# Patient Record
Sex: FEMALE | Race: WHITE | NOT HISPANIC OR LATINO | Employment: FULL TIME | URBAN - METROPOLITAN AREA
[De-identification: names, ages, dates, MRNs, and addresses within clinical notes are randomized per-mention and may not be internally consistent; named-entity substitution may affect disease eponyms.]

---

## 2023-05-04 ENCOUNTER — APPOINTMENT (OUTPATIENT)
Dept: PRIMARY CARE | Facility: CLINIC | Age: 53
End: 2023-05-04
Payer: COMMERCIAL

## 2023-05-05 ENCOUNTER — LAB (OUTPATIENT)
Dept: LAB | Facility: LAB | Age: 53
End: 2023-05-05
Payer: COMMERCIAL

## 2023-05-05 ENCOUNTER — OFFICE VISIT (OUTPATIENT)
Dept: PRIMARY CARE | Facility: CLINIC | Age: 53
End: 2023-05-05
Payer: COMMERCIAL

## 2023-05-05 VITALS
HEART RATE: 92 BPM | WEIGHT: 148 LBS | TEMPERATURE: 97.5 F | DIASTOLIC BLOOD PRESSURE: 94 MMHG | RESPIRATION RATE: 18 BRPM | HEIGHT: 61 IN | OXYGEN SATURATION: 98 % | SYSTOLIC BLOOD PRESSURE: 142 MMHG | BODY MASS INDEX: 27.94 KG/M2

## 2023-05-05 DIAGNOSIS — Z00.00 ANNUAL PHYSICAL EXAM: ICD-10-CM

## 2023-05-05 DIAGNOSIS — Z12.31 ENCOUNTER FOR SCREENING MAMMOGRAM FOR MALIGNANT NEOPLASM OF BREAST: ICD-10-CM

## 2023-05-05 DIAGNOSIS — Z12.11 COLON CANCER SCREENING: ICD-10-CM

## 2023-05-05 DIAGNOSIS — J30.9 ALLERGIC SINUSITIS: Primary | ICD-10-CM

## 2023-05-05 DIAGNOSIS — J01.00 ACUTE NON-RECURRENT MAXILLARY SINUSITIS: ICD-10-CM

## 2023-05-05 LAB
ALANINE AMINOTRANSFERASE (SGPT) (U/L) IN SER/PLAS: 12 U/L (ref 7–45)
ALBUMIN (G/DL) IN SER/PLAS: 4.6 G/DL (ref 3.4–5)
ALKALINE PHOSPHATASE (U/L) IN SER/PLAS: 72 U/L (ref 33–110)
ANION GAP IN SER/PLAS: 15 MMOL/L (ref 10–20)
ASPARTATE AMINOTRANSFERASE (SGOT) (U/L) IN SER/PLAS: 17 U/L (ref 9–39)
BASOPHILS (10*3/UL) IN BLOOD BY AUTOMATED COUNT: 0.07 X10E9/L (ref 0–0.1)
BASOPHILS/100 LEUKOCYTES IN BLOOD BY AUTOMATED COUNT: 0.6 % (ref 0–2)
BILIRUBIN TOTAL (MG/DL) IN SER/PLAS: 0.4 MG/DL (ref 0–1.2)
CALCIUM (MG/DL) IN SER/PLAS: 10.1 MG/DL (ref 8.6–10.3)
CARBON DIOXIDE, TOTAL (MMOL/L) IN SER/PLAS: 27 MMOL/L (ref 21–32)
CHLORIDE (MMOL/L) IN SER/PLAS: 100 MMOL/L (ref 98–107)
CHOLESTEROL (MG/DL) IN SER/PLAS: 182 MG/DL (ref 0–199)
CHOLESTEROL IN HDL (MG/DL) IN SER/PLAS: 92.9 MG/DL
CHOLESTEROL/HDL RATIO: 2
CREATININE (MG/DL) IN SER/PLAS: 0.74 MG/DL (ref 0.5–1.05)
EOSINOPHILS (10*3/UL) IN BLOOD BY AUTOMATED COUNT: 0.07 X10E9/L (ref 0–0.7)
EOSINOPHILS/100 LEUKOCYTES IN BLOOD BY AUTOMATED COUNT: 0.6 % (ref 0–6)
ERYTHROCYTE DISTRIBUTION WIDTH (RATIO) BY AUTOMATED COUNT: 11.8 % (ref 11.5–14.5)
ERYTHROCYTE MEAN CORPUSCULAR HEMOGLOBIN CONCENTRATION (G/DL) BY AUTOMATED: 33.3 G/DL (ref 32–36)
ERYTHROCYTE MEAN CORPUSCULAR VOLUME (FL) BY AUTOMATED COUNT: 97 FL (ref 80–100)
ERYTHROCYTES (10*6/UL) IN BLOOD BY AUTOMATED COUNT: 4.63 X10E12/L (ref 4–5.2)
GFR FEMALE: >90 ML/MIN/1.73M2
GLUCOSE (MG/DL) IN SER/PLAS: 75 MG/DL (ref 74–99)
HEMATOCRIT (%) IN BLOOD BY AUTOMATED COUNT: 45 % (ref 36–46)
HEMOGLOBIN (G/DL) IN BLOOD: 15 G/DL (ref 12–16)
HIV 1/ 2 AG/AB SCREEN: NONREACTIVE
IMMATURE GRANULOCYTES/100 LEUKOCYTES IN BLOOD BY AUTOMATED COUNT: 0.2 % (ref 0–0.9)
LDL: 63 MG/DL (ref 0–99)
LEUKOCYTES (10*3/UL) IN BLOOD BY AUTOMATED COUNT: 11.5 X10E9/L (ref 4.4–11.3)
LYMPHOCYTES (10*3/UL) IN BLOOD BY AUTOMATED COUNT: 3.3 X10E9/L (ref 1.2–4.8)
LYMPHOCYTES/100 LEUKOCYTES IN BLOOD BY AUTOMATED COUNT: 28.7 % (ref 13–44)
MONOCYTES (10*3/UL) IN BLOOD BY AUTOMATED COUNT: 0.6 X10E9/L (ref 0.1–1)
MONOCYTES/100 LEUKOCYTES IN BLOOD BY AUTOMATED COUNT: 5.2 % (ref 2–10)
NEUTROPHILS (10*3/UL) IN BLOOD BY AUTOMATED COUNT: 7.45 X10E9/L (ref 1.2–7.7)
NEUTROPHILS/100 LEUKOCYTES IN BLOOD BY AUTOMATED COUNT: 64.7 % (ref 40–80)
NRBC (PER 100 WBCS) BY AUTOMATED COUNT: 0 /100 WBC (ref 0–0)
PLATELETS (10*3/UL) IN BLOOD AUTOMATED COUNT: 385 X10E9/L (ref 150–450)
POTASSIUM (MMOL/L) IN SER/PLAS: 4.3 MMOL/L (ref 3.5–5.3)
PROTEIN TOTAL: 7.3 G/DL (ref 6.4–8.2)
SODIUM (MMOL/L) IN SER/PLAS: 138 MMOL/L (ref 136–145)
THYROTROPIN (MIU/L) IN SER/PLAS BY DETECTION LIMIT <= 0.05 MIU/L: 0.52 MIU/L (ref 0.44–3.98)
TRIGLYCERIDE (MG/DL) IN SER/PLAS: 133 MG/DL (ref 0–149)
UREA NITROGEN (MG/DL) IN SER/PLAS: 15 MG/DL (ref 6–23)
VLDL: 27 MG/DL (ref 0–40)

## 2023-05-05 PROCEDURE — 86803 HEPATITIS C AB TEST: CPT

## 2023-05-05 PROCEDURE — 85025 COMPLETE CBC W/AUTO DIFF WBC: CPT

## 2023-05-05 PROCEDURE — 80061 LIPID PANEL: CPT

## 2023-05-05 PROCEDURE — 80053 COMPREHEN METABOLIC PANEL: CPT

## 2023-05-05 PROCEDURE — 99204 OFFICE O/P NEW MOD 45 MIN: CPT | Performed by: FAMILY MEDICINE

## 2023-05-05 PROCEDURE — 84443 ASSAY THYROID STIM HORMONE: CPT

## 2023-05-05 PROCEDURE — 87389 HIV-1 AG W/HIV-1&-2 AB AG IA: CPT

## 2023-05-05 PROCEDURE — 36415 COLL VENOUS BLD VENIPUNCTURE: CPT

## 2023-05-05 RX ORDER — AZELASTINE 1 MG/ML
1 SPRAY, METERED NASAL 2 TIMES DAILY
Qty: 12 ML | Refills: 2 | Status: SHIPPED | OUTPATIENT
Start: 2023-05-05 | End: 2023-06-30 | Stop reason: SDUPTHER

## 2023-05-05 RX ORDER — ALBUTEROL SULFATE 90 UG/1
2 AEROSOL, METERED RESPIRATORY (INHALATION) EVERY 6 HOURS PRN
COMMUNITY

## 2023-05-05 RX ORDER — SULFAMETHOXAZOLE AND TRIMETHOPRIM 800; 160 MG/1; MG/1
1 TABLET ORAL 2 TIMES DAILY
Qty: 14 TABLET | Refills: 0 | Status: SHIPPED | OUTPATIENT
Start: 2023-05-05 | End: 2023-05-12

## 2023-05-05 RX ORDER — LORATADINE 10 MG/1
10 TABLET ORAL DAILY
Qty: 30 TABLET | Refills: 5 | Status: SHIPPED | OUTPATIENT
Start: 2023-05-05 | End: 2023-05-05 | Stop reason: ENTERED-IN-ERROR

## 2023-05-05 RX ORDER — PREDNISONE 50 MG/1
50 TABLET ORAL DAILY
COMMUNITY
Start: 2023-04-17 | End: 2023-05-26

## 2023-05-05 ASSESSMENT — PAIN SCALES - GENERAL: PAINLEVEL: 7

## 2023-05-05 ASSESSMENT — ENCOUNTER SYMPTOMS
RHINORRHEA: 1
SINUS PRESSURE: 1

## 2023-05-05 ASSESSMENT — PATIENT HEALTH QUESTIONNAIRE - PHQ9
1. LITTLE INTEREST OR PLEASURE IN DOING THINGS: NOT AT ALL
2. FEELING DOWN, DEPRESSED OR HOPELESS: NOT AT ALL
SUM OF ALL RESPONSES TO PHQ9 QUESTIONS 1 AND 2: 0

## 2023-05-05 NOTE — LETTER
May 5, 2023     Patient: Aranza Felipe   YOB: 1970   Date of Visit: 5/5/2023       To Whom It May Concern:    Aranza Felipe was seen in my clinic on 5/5/2023 at 10:45 am. Please excuse Aranza for her absence from work on 5/2 and 5/3     If you have any questions or concerns, please don't hesitate to call.         Sincerely,         Avel Welsh MD        CC: No Recipients

## 2023-05-05 NOTE — PROGRESS NOTES
"Subjective   Patient ID: Aranza Felipe is a 52 y.o. female who presents for Establish Care (Visited hospital April 18 no taste ,no smell ,sinus pain ,swollen throat ).    HPI   Establish care  Sinus and head congestion for 3 weeks  Took 2 days of antibiotics: Doxy- \"face swelled from it \"    Allergies history: dust, spring /fall    Works at [employer]:work excuse for 5/2 and 5/3  Has not seen doctor in 15 years.       Review of Systems   HENT:  Positive for postnasal drip, rhinorrhea and sinus pressure.        Objective   There were no vitals taken for this visit.    Physical Exam  Cardiovascular:      Rate and Rhythm: Normal rate and regular rhythm.   Pulmonary:      Effort: Pulmonary effort is normal.   Abdominal:      Palpations: Abdomen is soft.      Tenderness: There is no abdominal tenderness.   Musculoskeletal:      Cervical back: Neck supple.   Lymphadenopathy:      Cervical: No cervical adenopathy.   Neurological:      Mental Status: She is alert.         Assessment/Plan   Problem List Items Addressed This Visit    None  Visit Diagnoses       Allergic sinusitis    -  Primary    Relevant Medications    azelastine (Astelin) 137 mcg (0.1 %) nasal spray    Acute non-recurrent maxillary sinusitis        Relevant Medications    sulfamethoxazole-trimethoprim (Bactrim DS) 800-160 mg tablet    Encounter for screening mammogram for malignant neoplasm of breast        Relevant Orders    BI mammo bilateral screening tomosynthesis    Annual physical exam        Relevant Orders    Lipid panel    Comprehensive Metabolic Panel    HIV 1/2 antibodies, rapid    Hepatitis C antibody    CBC and Auto Differential    TSH with reflex to Free T4 if abnormal    BI mammo bilateral screening tomosynthesis    Colon cancer screening        Relevant Orders    Colonoscopy               "

## 2023-05-06 LAB — HEPATITIS C VIRUS AB PRESENCE IN SERUM: NONREACTIVE

## 2023-05-23 ENCOUNTER — TELEPHONE (OUTPATIENT)
Dept: PRIMARY CARE | Facility: CLINIC | Age: 53
End: 2023-05-23

## 2023-05-23 NOTE — TELEPHONE ENCOUNTER
----- Message from Avel NARANJO MD sent at 5/23/2023  9:30 AM EDT -----  Notify patient No mammographic evidence of malignancy.Recommendation: Follow-up mammogram in one year or sooner if  clinically indicated.

## 2023-05-26 ENCOUNTER — OFFICE VISIT (OUTPATIENT)
Dept: PRIMARY CARE | Facility: CLINIC | Age: 53
End: 2023-05-26
Payer: COMMERCIAL

## 2023-05-26 VITALS
HEART RATE: 81 BPM | TEMPERATURE: 97.3 F | OXYGEN SATURATION: 99 % | WEIGHT: 151 LBS | RESPIRATION RATE: 18 BRPM | DIASTOLIC BLOOD PRESSURE: 90 MMHG | SYSTOLIC BLOOD PRESSURE: 150 MMHG | BODY MASS INDEX: 28.53 KG/M2

## 2023-05-26 DIAGNOSIS — I10 PRIMARY HYPERTENSION: Primary | ICD-10-CM

## 2023-05-26 PROCEDURE — 3080F DIAST BP >= 90 MM HG: CPT | Performed by: FAMILY MEDICINE

## 2023-05-26 PROCEDURE — 3077F SYST BP >= 140 MM HG: CPT | Performed by: FAMILY MEDICINE

## 2023-05-26 PROCEDURE — 99214 OFFICE O/P EST MOD 30 MIN: CPT | Performed by: FAMILY MEDICINE

## 2023-05-26 RX ORDER — ATENOLOL 25 MG/1
25 TABLET ORAL 2 TIMES DAILY
Qty: 180 TABLET | Refills: 0 | Status: SHIPPED | OUTPATIENT
Start: 2023-05-26 | End: 2024-05-10

## 2023-05-26 ASSESSMENT — PATIENT HEALTH QUESTIONNAIRE - PHQ9
SUM OF ALL RESPONSES TO PHQ9 QUESTIONS 1 AND 2: 0
1. LITTLE INTEREST OR PLEASURE IN DOING THINGS: NOT AT ALL
2. FEELING DOWN, DEPRESSED OR HOPELESS: NOT AT ALL

## 2023-05-26 NOTE — PROGRESS NOTES
Losartan  Subjective   Patient ID: Aranza Felipe is a 53 y.o. female who presents for 3 weeks follow up (Blood pressure follow up).    HPI : Home BP    5/12 157/94 67 (heart rate)   5/14 166/103 66   5/18 127/87 72   5/21 155/97 72   5/22 162/95 82   5/23 157/96 67   5/24 152/94 73   5/25 164/98 75     Increased stress at home: works at [employer], 10 hour shifts, stays on feet and assigned to do multiple tasks in  taking its tole. Heart rate goes up to 180s when running around at work.    Discuss labs  Review of Systems   All other systems reviewed and are negative.      Objective   /90   Pulse 81   Temp 36.3 °C (97.3 °F)   Resp 18   Wt 68.5 kg (151 lb)   SpO2 99%   BMI 28.53 kg/m²     Physical Exam  Vitals and nursing note reviewed.   Cardiovascular:      Rate and Rhythm: Normal rate and regular rhythm.   Pulmonary:      Effort: Pulmonary effort is normal.      Breath sounds: Normal breath sounds.   Neurological:      Mental Status: She is alert.   Psychiatric:         Mood and Affect: Mood normal.         Behavior: Behavior normal.         Thought Content: Thought content normal.         Judgment: Judgment normal.         Assessment/Plan   Problem List Items Addressed This Visit          Circulatory    Primary hypertension - Primary    Relevant Medications    atenolol (Tenormin) 25 mg tablet

## 2023-06-30 ENCOUNTER — OFFICE VISIT (OUTPATIENT)
Dept: PRIMARY CARE | Facility: CLINIC | Age: 53
End: 2023-06-30
Payer: COMMERCIAL

## 2023-06-30 VITALS
OXYGEN SATURATION: 96 % | BODY MASS INDEX: 28.32 KG/M2 | SYSTOLIC BLOOD PRESSURE: 130 MMHG | HEIGHT: 61 IN | WEIGHT: 150 LBS | DIASTOLIC BLOOD PRESSURE: 74 MMHG | RESPIRATION RATE: 18 BRPM | TEMPERATURE: 97.5 F | HEART RATE: 78 BPM

## 2023-06-30 DIAGNOSIS — M25.512 ACUTE PAIN OF LEFT SHOULDER: ICD-10-CM

## 2023-06-30 DIAGNOSIS — I10 PRIMARY HYPERTENSION: Primary | ICD-10-CM

## 2023-06-30 DIAGNOSIS — J30.9 ALLERGIC SINUSITIS: ICD-10-CM

## 2023-06-30 PROCEDURE — 99213 OFFICE O/P EST LOW 20 MIN: CPT | Performed by: FAMILY MEDICINE

## 2023-06-30 PROCEDURE — 3075F SYST BP GE 130 - 139MM HG: CPT | Performed by: FAMILY MEDICINE

## 2023-06-30 PROCEDURE — 3078F DIAST BP <80 MM HG: CPT | Performed by: FAMILY MEDICINE

## 2023-06-30 RX ORDER — AZELASTINE 1 MG/ML
1 SPRAY, METERED NASAL 2 TIMES DAILY
Qty: 12 ML | Refills: 2 | Status: SHIPPED | OUTPATIENT
Start: 2023-06-30 | End: 2024-05-10 | Stop reason: SDUPTHER

## 2023-06-30 RX ORDER — MELOXICAM 7.5 MG/1
7.5 TABLET ORAL DAILY
Qty: 30 TABLET | Refills: 0 | Status: SHIPPED | OUTPATIENT
Start: 2023-06-30 | End: 2023-07-30

## 2023-06-30 ASSESSMENT — PAIN SCALES - GENERAL: PAINLEVEL: 3

## 2023-06-30 NOTE — PROGRESS NOTES
"Subjective   Patient ID: Aranza Felipe is a 53 y.o. female who presents for 1 MONTH FOLLOW UP and Shoulder Pain (LEFT SHOULDER PAIN, STARTED TWO WEEKS AGO,DENIES ACCIDENT/ INJURY ).     HPI:    Follow up BP    Left shoulder pain for 2 weeks: \" likely from work\"- works at [employer], does repetitive lifting/carrying/over reach- \"over worked \"  Pain all around shoulder joint,but mostly top of shoulder when try to reach out or reach back.  Refill Afrin     Review of Systems   All other systems reviewed and are negative.      Objective   /74 (BP Location: Left arm, Patient Position: Sitting, BP Cuff Size: Adult)   Pulse 78   Temp 36.4 °C (97.5 °F)   Resp 18   Ht 1.549 m (5' 1\")   Wt 68 kg (150 lb)   SpO2 96%   BMI 28.34 kg/m²     Physical Exam  Vitals and nursing note reviewed.   Cardiovascular:      Rate and Rhythm: Normal rate and regular rhythm.   Pulmonary:      Effort: Pulmonary effort is normal.      Breath sounds: Normal breath sounds.   Musculoskeletal:      Right shoulder: Normal.      Left shoulder: Tenderness and crepitus present. Normal range of motion. Normal strength.   Neurological:      Mental Status: She is alert.         Assessment/Plan   Problem List Items Addressed This Visit       Primary hypertension - Primary    Relevant Orders    Follow Up In Primary Care     Other Visit Diagnoses       Acute pain of left shoulder        Relevant Medications    meloxicam (Mobic) 7.5 mg tablet    Allergic sinusitis        Relevant Medications    azelastine (Astelin) 137 mcg (0.1 %) nasal spray               "

## 2024-05-10 ENCOUNTER — OFFICE VISIT (OUTPATIENT)
Dept: PRIMARY CARE | Facility: CLINIC | Age: 54
End: 2024-05-10
Payer: COMMERCIAL

## 2024-05-10 ENCOUNTER — LAB (OUTPATIENT)
Dept: LAB | Facility: LAB | Age: 54
End: 2024-05-10
Payer: COMMERCIAL

## 2024-05-10 VITALS — SYSTOLIC BLOOD PRESSURE: 138 MMHG | WEIGHT: 187 LBS | BODY MASS INDEX: 35.33 KG/M2 | DIASTOLIC BLOOD PRESSURE: 84 MMHG

## 2024-05-10 DIAGNOSIS — J30.9 ALLERGIC SINUSITIS: ICD-10-CM

## 2024-05-10 DIAGNOSIS — K44.9 HIATUS HERNIA SYNDROME: Primary | ICD-10-CM

## 2024-05-10 DIAGNOSIS — Z00.00 ANNUAL PHYSICAL EXAM: ICD-10-CM

## 2024-05-10 DIAGNOSIS — M25.561 CHRONIC PAIN OF RIGHT KNEE: ICD-10-CM

## 2024-05-10 DIAGNOSIS — G89.29 CHRONIC PAIN OF RIGHT KNEE: ICD-10-CM

## 2024-05-10 LAB
ALBUMIN SERPL BCP-MCNC: 4.4 G/DL (ref 3.4–5)
ALP SERPL-CCNC: 85 U/L (ref 33–110)
ALT SERPL W P-5'-P-CCNC: 13 U/L (ref 7–45)
ANION GAP SERPL CALC-SCNC: 12 MMOL/L (ref 10–20)
AST SERPL W P-5'-P-CCNC: 16 U/L (ref 9–39)
BASOPHILS # BLD AUTO: 0.05 X10*3/UL (ref 0–0.1)
BASOPHILS NFR BLD AUTO: 0.7 %
BILIRUB SERPL-MCNC: 0.4 MG/DL (ref 0–1.2)
BUN SERPL-MCNC: 9 MG/DL (ref 6–23)
CALCIUM SERPL-MCNC: 10.6 MG/DL (ref 8.6–10.3)
CHLORIDE SERPL-SCNC: 106 MMOL/L (ref 98–107)
CHOLEST SERPL-MCNC: 250 MG/DL (ref 0–199)
CHOLESTEROL/HDL RATIO: 4
CO2 SERPL-SCNC: 28 MMOL/L (ref 21–32)
CREAT SERPL-MCNC: 0.68 MG/DL (ref 0.5–1.05)
EGFRCR SERPLBLD CKD-EPI 2021: >90 ML/MIN/1.73M*2
EOSINOPHIL # BLD AUTO: 0.14 X10*3/UL (ref 0–0.7)
EOSINOPHIL NFR BLD AUTO: 2 %
ERYTHROCYTE [DISTWIDTH] IN BLOOD BY AUTOMATED COUNT: 12.1 % (ref 11.5–14.5)
EST. AVERAGE GLUCOSE BLD GHB EST-MCNC: 108 MG/DL
GLUCOSE SERPL-MCNC: 93 MG/DL (ref 74–99)
HBA1C MFR BLD: 5.4 %
HCT VFR BLD AUTO: 46.2 % (ref 36–46)
HDLC SERPL-MCNC: 62.6 MG/DL
HGB BLD-MCNC: 15.6 G/DL (ref 12–16)
IMM GRANULOCYTES # BLD AUTO: 0.03 X10*3/UL (ref 0–0.7)
IMM GRANULOCYTES NFR BLD AUTO: 0.4 % (ref 0–0.9)
LDLC SERPL CALC-MCNC: 150 MG/DL
LYMPHOCYTES # BLD AUTO: 2.39 X10*3/UL (ref 1.2–4.8)
LYMPHOCYTES NFR BLD AUTO: 33.3 %
MCH RBC QN AUTO: 32.2 PG (ref 26–34)
MCHC RBC AUTO-ENTMCNC: 33.8 G/DL (ref 32–36)
MCV RBC AUTO: 96 FL (ref 80–100)
MONOCYTES # BLD AUTO: 0.44 X10*3/UL (ref 0.1–1)
MONOCYTES NFR BLD AUTO: 6.1 %
NEUTROPHILS # BLD AUTO: 4.12 X10*3/UL (ref 1.2–7.7)
NEUTROPHILS NFR BLD AUTO: 57.5 %
NON HDL CHOLESTEROL: 187 MG/DL (ref 0–149)
NRBC BLD-RTO: 0 /100 WBCS (ref 0–0)
PLATELET # BLD AUTO: 342 X10*3/UL (ref 150–450)
POTASSIUM SERPL-SCNC: 4.4 MMOL/L (ref 3.5–5.3)
PROT SERPL-MCNC: 6.9 G/DL (ref 6.4–8.2)
RBC # BLD AUTO: 4.84 X10*6/UL (ref 4–5.2)
SODIUM SERPL-SCNC: 142 MMOL/L (ref 136–145)
TRIGL SERPL-MCNC: 189 MG/DL (ref 0–149)
TSH SERPL-ACNC: 1.83 MIU/L (ref 0.44–3.98)
VLDL: 38 MG/DL (ref 0–40)
WBC # BLD AUTO: 7.2 X10*3/UL (ref 4.4–11.3)

## 2024-05-10 PROCEDURE — 99214 OFFICE O/P EST MOD 30 MIN: CPT | Performed by: FAMILY MEDICINE

## 2024-05-10 PROCEDURE — 83036 HEMOGLOBIN GLYCOSYLATED A1C: CPT

## 2024-05-10 PROCEDURE — 80061 LIPID PANEL: CPT

## 2024-05-10 PROCEDURE — 85025 COMPLETE CBC W/AUTO DIFF WBC: CPT

## 2024-05-10 PROCEDURE — 84443 ASSAY THYROID STIM HORMONE: CPT

## 2024-05-10 PROCEDURE — 3075F SYST BP GE 130 - 139MM HG: CPT | Performed by: FAMILY MEDICINE

## 2024-05-10 PROCEDURE — 3079F DIAST BP 80-89 MM HG: CPT | Performed by: FAMILY MEDICINE

## 2024-05-10 PROCEDURE — 36415 COLL VENOUS BLD VENIPUNCTURE: CPT

## 2024-05-10 PROCEDURE — 80053 COMPREHEN METABOLIC PANEL: CPT

## 2024-05-10 RX ORDER — AZELASTINE 1 MG/ML
1 SPRAY, METERED NASAL 2 TIMES DAILY
Qty: 12 ML | Refills: 2 | Status: SHIPPED | OUTPATIENT
Start: 2024-05-10 | End: 2024-08-08

## 2024-05-10 RX ORDER — OMEPRAZOLE 20 MG/1
20 TABLET, DELAYED RELEASE ORAL DAILY
COMMUNITY
Start: 2024-05-10 | End: 2024-05-29 | Stop reason: WASHOUT

## 2024-05-10 NOTE — PROGRESS NOTES
Subjective   Patient ID: Aranza Soler is a 53 y.o. female who presents for Heartburn (Vomiting some blood./Diet has changed due to heartburn./), Knee Pain (Right knee/ Swollen tibia. Has to compensate for balance./Has been present for a few weeks), and Weight Gain.    HPI :   Quit work and restarted after 3 months at [employer].    Gained weight   Started to drink alcohol on and off from stress     Weight gain and returning to work : caused right knee pain   Review of Systems   All other systems reviewed and are negative.      Objective   /84   Wt 84.8 kg (187 lb)   BMI 35.33 kg/m²     Physical Exam  Vitals and nursing note reviewed.   Cardiovascular:      Rate and Rhythm: Normal rate and regular rhythm.   Pulmonary:      Effort: Pulmonary effort is normal.      Breath sounds: Normal breath sounds.   Musculoskeletal:      Cervical back: Neck supple.   Neurological:      Mental Status: She is alert.         Assessment/Plan   Diagnoses and all orders for this visit:  Hiatus hernia syndrome  -     omeprazole OTC (PriLOSEC OTC) 20 mg EC tablet; Take 1 tablet (20 mg) by mouth once daily. Do not crush, chew, or split.  -     Referral to Gastroenterology; Future  Allergic sinusitis  -     azelastine (Astelin) 137 mcg (0.1 %) nasal spray; Administer 1 spray into each nostril 2 times a day. Use in each nostril as directed  Annual physical exam  -     Hemoglobin A1c; Future  -     CBC and Auto Differential; Future  -     Comprehensive Metabolic Panel; Future  -     Lipid panel; Future  -     TSH; Future  Other orders  -     Follow Up In Primary Care - Established; Future       For right knee pain: loose weight, take tylenol, and Analgesic : follow up in month to reassess.

## 2024-05-13 ENCOUNTER — TELEPHONE (OUTPATIENT)
Dept: PRIMARY CARE | Facility: CLINIC | Age: 54
End: 2024-05-13
Payer: COMMERCIAL

## 2024-05-13 NOTE — TELEPHONE ENCOUNTER
----- Message from Avel NARANJO MD sent at 5/13/2024 11:43 AM EDT -----  Notify patient 1. Cholestrol is high: recommend mediterranean diet, exercise 2. Calcium is slightly high. I will discuss these results at her appointment on 6/14.

## 2024-05-29 ENCOUNTER — OFFICE VISIT (OUTPATIENT)
Dept: GASTROENTEROLOGY | Facility: CLINIC | Age: 54
End: 2024-05-29
Payer: COMMERCIAL

## 2024-05-29 VITALS
RESPIRATION RATE: 18 BRPM | HEIGHT: 61 IN | HEART RATE: 80 BPM | BODY MASS INDEX: 34.55 KG/M2 | OXYGEN SATURATION: 98 % | SYSTOLIC BLOOD PRESSURE: 141 MMHG | DIASTOLIC BLOOD PRESSURE: 91 MMHG | WEIGHT: 183 LBS

## 2024-05-29 DIAGNOSIS — K92.0 HEMATEMESIS WITH NAUSEA: ICD-10-CM

## 2024-05-29 DIAGNOSIS — R10.13 EPIGASTRIC PAIN: ICD-10-CM

## 2024-05-29 DIAGNOSIS — K21.9 GASTROESOPHAGEAL REFLUX DISEASE, UNSPECIFIED WHETHER ESOPHAGITIS PRESENT: Primary | ICD-10-CM

## 2024-05-29 DIAGNOSIS — Z79.1 NSAID LONG-TERM USE: ICD-10-CM

## 2024-05-29 DIAGNOSIS — R19.5 LOOSE STOOLS: ICD-10-CM

## 2024-05-29 DIAGNOSIS — R11.2 NAUSEA AND VOMITING, UNSPECIFIED VOMITING TYPE: ICD-10-CM

## 2024-05-29 DIAGNOSIS — F17.200 SMOKER: ICD-10-CM

## 2024-05-29 DIAGNOSIS — R19.5 DARK STOOLS: ICD-10-CM

## 2024-05-29 DIAGNOSIS — K44.9 HIATUS HERNIA SYNDROME: ICD-10-CM

## 2024-05-29 DIAGNOSIS — R13.10 DYSPHAGIA, UNSPECIFIED TYPE: ICD-10-CM

## 2024-05-29 PROCEDURE — 99204 OFFICE O/P NEW MOD 45 MIN: CPT | Performed by: NURSE PRACTITIONER

## 2024-05-29 PROCEDURE — 3077F SYST BP >= 140 MM HG: CPT | Performed by: NURSE PRACTITIONER

## 2024-05-29 PROCEDURE — 3080F DIAST BP >= 90 MM HG: CPT | Performed by: NURSE PRACTITIONER

## 2024-05-29 RX ORDER — OMEPRAZOLE 40 MG/1
40 CAPSULE, DELAYED RELEASE ORAL
Qty: 180 CAPSULE | Refills: 3 | Status: SHIPPED | OUTPATIENT
Start: 2024-05-29 | End: 2025-05-29

## 2024-05-29 RX ORDER — SODIUM, POTASSIUM,MAG SULFATES 17.5-3.13G
SOLUTION, RECONSTITUTED, ORAL ORAL
Qty: 354 ML | Refills: 0 | Status: SHIPPED | OUTPATIENT
Start: 2024-05-29

## 2024-05-29 NOTE — PROGRESS NOTES
Subjective   Patient ID: Aranza Soler is a 54 y.o. female who presents for Hernia (Causing SOB. Feels pressure in epigastric region.) and GERD (Takes famotidine, but states she throw this up. Has seen pink in vomit but hasn't vomited in a couple of weeks. Has cut out a lot of things in diet, including vitamins. She does take Aleve for knee pain often. Has never had EGD. Last colonoscopy was in her 30s.).  HPI  Patient presents to the clinic accompanied by her spouse, Nilton.  She has a constellation of GI complaints.  Patient with worsening GERD symptoms despite taking OTC Prilosec.  Endorses nausea and bilious emesis that waxes and wanes.  Experienced bright red emesis with mucus 3 weeks ago.  She also endorses melena 3 weeks ago.  She denies Pepto-Bismol.  Endorses dysphagia to both liquids and solids, this waxes and wanes.  Epigastric pain.  Takes Aleve daily for joint pain.  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  She is a daily smoker.  She is not sure of her family history.  Bowel movements are daily, but loose.  No hematochezia.  Weight gain.  Appetite is low.  Current Outpatient Medications on File Prior to Visit   Medication Sig Dispense Refill    albuterol 90 mcg/actuation inhaler Inhale 2 puffs every 6 hours if needed for wheezing.      azelastine (Astelin) 137 mcg (0.1 %) nasal spray Administer 1 spray into each nostril 2 times a day. Use in each nostril as directed 12 mL 2    [DISCONTINUED] omeprazole OTC (PriLOSEC OTC) 20 mg EC tablet Take 1 tablet (20 mg) by mouth once daily. Do not crush, chew, or split. (Patient not taking: Reported on 5/29/2024)       No current facility-administered medications on file prior to visit.        Review of Systems   All other systems reviewed and are negative.      Objective   Physical Exam  Vitals reviewed.   Constitutional:       General: She is awake. She is not in acute distress.     Appearance: Normal appearance. She is well-developed and normal weight.  "She is not ill-appearing, toxic-appearing or diaphoretic.   HENT:      Head: Normocephalic and atraumatic.   Eyes:      General: No scleral icterus.     Pupils: Pupils are equal, round, and reactive to light.   Cardiovascular:      Rate and Rhythm: Normal rate and regular rhythm.      Heart sounds: Normal heart sounds. No murmur heard.  Pulmonary:      Effort: Pulmonary effort is normal. No respiratory distress.      Breath sounds: Decreased breath sounds present.   Abdominal:      General: Abdomen is protuberant. Bowel sounds are normal.      Palpations: Abdomen is soft. There is no hepatomegaly.      Tenderness: There is no abdominal tenderness.      Hernia: No hernia is present.   Musculoskeletal:         General: Normal range of motion.      Cervical back: Normal range of motion.      Right lower leg: No edema.      Left lower leg: No edema.   Skin:     General: Skin is warm and dry.      Coloration: Skin is not jaundiced or pale.   Neurological:      General: No focal deficit present.      Mental Status: She is alert and oriented to person, place, and time.      Motor: No weakness.      Gait: Gait normal.   Psychiatric:         Mood and Affect: Mood normal.         Behavior: Behavior is cooperative.         Thought Content: Thought content normal.         Judgment: Judgment normal.       BP (!) 141/91   Pulse 80   Resp 18   Ht 1.549 m (5' 1\")   Wt 83 kg (183 lb)   SpO2 98%   BMI 34.58 kg/m²      Lab Results   Component Value Date    WBC 7.2 05/10/2024    HGB 15.6 05/10/2024    HCT 46.2 (H) 05/10/2024    MCV 96 05/10/2024     05/10/2024           No lab exists for component: \"LABALBU\"    No results found for: \"AFP\"  Lab Results   Component Value Date    TSH 1.83 05/10/2024         Assessment/Plan   Diagnoses and all orders for this visit:  Gastroesophageal reflux disease, unspecified whether esophagitis present  Acid suppression medication b.I.d.   - Advised on the correct dose and timing of the " PPI  - Pathophysiology and etiology of reflux discussed at length, with help of images.  - Anti-reflux lifestyle modification discussed at length   --Avoid spicy acidic based foods   --Limit coffee, tea, alcohol use   --Limit the amount of food during meal time, avoid gorging   --Avoid bedtime snacks and eat meals 3 to 4 hrs before lying down   --Stop (or atleast cut down) Smoking.   --Elevate the head of the bed with blocks   --Weight reduction advised and discussed at length   - EGD requested to assess/exclude for esophagitis/hiatal hernia. (Procedure risks and instruction discussed).  -     omeprazole (PriLOSEC) 40 mg DR capsule; Take 1 capsule (40 mg) by mouth 2 times a day before meals. Do not crush or chew.  Dysphagia, unspecified type  Ddx: Spasms from GERD, esophagitis, Schatzki's ring, dysmotility, neoplasm?  Nausea and vomiting, unspecified vomiting type  Smoker  -Smoking cessation  Epigastric pain  Risk factors include daily NSAID use, previous alcohol use (5 months sober from alcohol).  EGD to rule out PUD, gastritis versus duodenitis.  Dark stools  Recommend EGD and colonoscopy due to dark stools, loose stools and abdominal pain.  Remote colonoscopy.  Both procedures were discussed at length, including all possible risks.  Importance of the bowel prep was stressed to the patient for optimal results.  [place] [facility] [place]  Loose stools  NSAID long-term use  Hematemesis with nausea    Craolin Zaragoza CNP

## 2024-06-14 ENCOUNTER — APPOINTMENT (OUTPATIENT)
Dept: PRIMARY CARE | Facility: CLINIC | Age: 54
End: 2024-06-14
Payer: COMMERCIAL

## 2024-06-27 ENCOUNTER — TELEPHONE (OUTPATIENT)
Dept: GASTROENTEROLOGY | Facility: CLINIC | Age: 54
End: 2024-06-27
Payer: COMMERCIAL

## 2024-06-27 NOTE — TELEPHONE ENCOUNTER
I left a message to call [phone] to reschedule virtual appointment with Lizy on 7/19/24, she will  not be in office.  I also let her know I'm canceling appointment.  TM

## 2024-07-19 ENCOUNTER — APPOINTMENT (OUTPATIENT)
Dept: GASTROENTEROLOGY | Facility: CLINIC | Age: 54
End: 2024-07-19
Payer: COMMERCIAL

## 2024-08-05 ENCOUNTER — APPOINTMENT (OUTPATIENT)
Dept: CARDIOLOGY | Facility: HOSPITAL | Age: 54
End: 2024-08-05
Payer: COMMERCIAL

## 2024-08-05 ENCOUNTER — APPOINTMENT (OUTPATIENT)
Dept: RADIOLOGY | Facility: HOSPITAL | Age: 54
End: 2024-08-05
Payer: COMMERCIAL

## 2024-08-05 ENCOUNTER — HOSPITAL ENCOUNTER (EMERGENCY)
Facility: HOSPITAL | Age: 54
Discharge: HOME | End: 2024-08-06
Attending: EMERGENCY MEDICINE
Payer: COMMERCIAL

## 2024-08-05 DIAGNOSIS — R06.02 SHORTNESS OF BREATH: ICD-10-CM

## 2024-08-05 DIAGNOSIS — R07.9 CHEST PAIN, UNSPECIFIED TYPE: Primary | ICD-10-CM

## 2024-08-05 LAB
ALBUMIN SERPL BCP-MCNC: 4.7 G/DL (ref 3.4–5)
ALP SERPL-CCNC: 94 U/L (ref 33–110)
ALT SERPL W P-5'-P-CCNC: 10 U/L (ref 7–45)
ANION GAP SERPL CALC-SCNC: 14 MMOL/L (ref 10–20)
AST SERPL W P-5'-P-CCNC: 12 U/L (ref 9–39)
BASOPHILS # BLD AUTO: 0.05 X10*3/UL (ref 0–0.1)
BASOPHILS NFR BLD AUTO: 0.7 %
BILIRUB SERPL-MCNC: 0.4 MG/DL (ref 0–1.2)
BNP SERPL-MCNC: 16 PG/ML (ref 0–99)
BUN SERPL-MCNC: 6 MG/DL (ref 6–23)
CALCIUM SERPL-MCNC: 10.5 MG/DL (ref 8.6–10.3)
CARDIAC TROPONIN I PNL SERPL HS: 4 NG/L (ref 0–13)
CARDIAC TROPONIN I PNL SERPL HS: 4 NG/L (ref 0–13)
CHLORIDE SERPL-SCNC: 104 MMOL/L (ref 98–107)
CO2 SERPL-SCNC: 24 MMOL/L (ref 21–32)
CREAT SERPL-MCNC: 0.69 MG/DL (ref 0.5–1.05)
EGFRCR SERPLBLD CKD-EPI 2021: >90 ML/MIN/1.73M*2
EOSINOPHIL # BLD AUTO: 0.11 X10*3/UL (ref 0–0.7)
EOSINOPHIL NFR BLD AUTO: 1.5 %
ERYTHROCYTE [DISTWIDTH] IN BLOOD BY AUTOMATED COUNT: 12.1 % (ref 11.5–14.5)
GLUCOSE SERPL-MCNC: 102 MG/DL (ref 74–99)
HCT VFR BLD AUTO: 47.4 % (ref 36–46)
HGB BLD-MCNC: 16.9 G/DL (ref 12–16)
IMM GRANULOCYTES # BLD AUTO: 0.02 X10*3/UL (ref 0–0.7)
IMM GRANULOCYTES NFR BLD AUTO: 0.3 % (ref 0–0.9)
LYMPHOCYTES # BLD AUTO: 3.56 X10*3/UL (ref 1.2–4.8)
LYMPHOCYTES NFR BLD AUTO: 48.8 %
MAGNESIUM SERPL-MCNC: 1.96 MG/DL (ref 1.6–2.4)
MCH RBC QN AUTO: 32.8 PG (ref 26–34)
MCHC RBC AUTO-ENTMCNC: 35.7 G/DL (ref 32–36)
MCV RBC AUTO: 92 FL (ref 80–100)
MONOCYTES # BLD AUTO: 0.31 X10*3/UL (ref 0.1–1)
MONOCYTES NFR BLD AUTO: 4.2 %
NEUTROPHILS # BLD AUTO: 3.25 X10*3/UL (ref 1.2–7.7)
NEUTROPHILS NFR BLD AUTO: 44.5 %
NRBC BLD-RTO: 0 /100 WBCS (ref 0–0)
PLATELET # BLD AUTO: 338 X10*3/UL (ref 150–450)
POTASSIUM SERPL-SCNC: 3.6 MMOL/L (ref 3.5–5.3)
PROT SERPL-MCNC: 7.5 G/DL (ref 6.4–8.2)
RBC # BLD AUTO: 5.16 X10*6/UL (ref 4–5.2)
SODIUM SERPL-SCNC: 138 MMOL/L (ref 136–145)
WBC # BLD AUTO: 7.3 X10*3/UL (ref 4.4–11.3)

## 2024-08-05 PROCEDURE — 94640 AIRWAY INHALATION TREATMENT: CPT

## 2024-08-05 PROCEDURE — 84484 ASSAY OF TROPONIN QUANT: CPT | Performed by: EMERGENCY MEDICINE

## 2024-08-05 PROCEDURE — 93005 ELECTROCARDIOGRAM TRACING: CPT

## 2024-08-05 PROCEDURE — 85025 COMPLETE CBC W/AUTO DIFF WBC: CPT | Performed by: EMERGENCY MEDICINE

## 2024-08-05 PROCEDURE — 99285 EMERGENCY DEPT VISIT HI MDM: CPT | Mod: 25

## 2024-08-05 PROCEDURE — 71045 X-RAY EXAM CHEST 1 VIEW: CPT

## 2024-08-05 PROCEDURE — 74174 CTA ABD&PLVS W/CONTRAST: CPT | Performed by: STUDENT IN AN ORGANIZED HEALTH CARE EDUCATION/TRAINING PROGRAM

## 2024-08-05 PROCEDURE — 36415 COLL VENOUS BLD VENIPUNCTURE: CPT | Performed by: EMERGENCY MEDICINE

## 2024-08-05 PROCEDURE — 71275 CT ANGIOGRAPHY CHEST: CPT

## 2024-08-05 PROCEDURE — 2550000001 HC RX 255 CONTRASTS: Performed by: EMERGENCY MEDICINE

## 2024-08-05 PROCEDURE — 2500000002 HC RX 250 W HCPCS SELF ADMINISTERED DRUGS (ALT 637 FOR MEDICARE OP, ALT 636 FOR OP/ED): Performed by: EMERGENCY MEDICINE

## 2024-08-05 PROCEDURE — 84075 ASSAY ALKALINE PHOSPHATASE: CPT | Performed by: EMERGENCY MEDICINE

## 2024-08-05 PROCEDURE — 83880 ASSAY OF NATRIURETIC PEPTIDE: CPT | Performed by: EMERGENCY MEDICINE

## 2024-08-05 PROCEDURE — 71045 X-RAY EXAM CHEST 1 VIEW: CPT | Performed by: RADIOLOGY

## 2024-08-05 PROCEDURE — 71275 CT ANGIOGRAPHY CHEST: CPT | Performed by: STUDENT IN AN ORGANIZED HEALTH CARE EDUCATION/TRAINING PROGRAM

## 2024-08-05 PROCEDURE — 83735 ASSAY OF MAGNESIUM: CPT | Performed by: EMERGENCY MEDICINE

## 2024-08-05 RX ORDER — IPRATROPIUM BROMIDE AND ALBUTEROL SULFATE 2.5; .5 MG/3ML; MG/3ML
3 SOLUTION RESPIRATORY (INHALATION) ONCE
Status: COMPLETED | OUTPATIENT
Start: 2024-08-05 | End: 2024-08-05

## 2024-08-05 RX ORDER — ACETAMINOPHEN 325 MG/1
650 TABLET ORAL ONCE
Status: COMPLETED | OUTPATIENT
Start: 2024-08-05 | End: 2024-08-05

## 2024-08-05 ASSESSMENT — PAIN DESCRIPTION - PAIN TYPE: TYPE: ACUTE PAIN

## 2024-08-05 ASSESSMENT — PAIN SCALES - GENERAL
PAINLEVEL_OUTOF10: 8
PAINLEVEL_OUTOF10: 5 - MODERATE PAIN
PAINLEVEL_OUTOF10: 6

## 2024-08-05 ASSESSMENT — PAIN DESCRIPTION - LOCATION: LOCATION: CHEST

## 2024-08-05 ASSESSMENT — PAIN DESCRIPTION - DESCRIPTORS: DESCRIPTORS: ACHING

## 2024-08-05 ASSESSMENT — COLUMBIA-SUICIDE SEVERITY RATING SCALE - C-SSRS
2. HAVE YOU ACTUALLY HAD ANY THOUGHTS OF KILLING YOURSELF?: NO
1. IN THE PAST MONTH, HAVE YOU WISHED YOU WERE DEAD OR WISHED YOU COULD GO TO SLEEP AND NOT WAKE UP?: NO
6. HAVE YOU EVER DONE ANYTHING, STARTED TO DO ANYTHING, OR PREPARED TO DO ANYTHING TO END YOUR LIFE?: NO

## 2024-08-05 ASSESSMENT — PAIN - FUNCTIONAL ASSESSMENT: PAIN_FUNCTIONAL_ASSESSMENT: 0-10

## 2024-08-06 VITALS
RESPIRATION RATE: 18 BRPM | TEMPERATURE: 97.5 F | BODY MASS INDEX: 32.47 KG/M2 | HEART RATE: 72 BPM | HEIGHT: 61 IN | DIASTOLIC BLOOD PRESSURE: 74 MMHG | WEIGHT: 171.96 LBS | OXYGEN SATURATION: 96 % | SYSTOLIC BLOOD PRESSURE: 153 MMHG

## 2024-08-06 PROCEDURE — 2500000004 HC RX 250 GENERAL PHARMACY W/ HCPCS (ALT 636 FOR OP/ED): Performed by: EMERGENCY MEDICINE

## 2024-08-06 PROCEDURE — 96375 TX/PRO/DX INJ NEW DRUG ADDON: CPT

## 2024-08-06 PROCEDURE — 96365 THER/PROPH/DIAG IV INF INIT: CPT

## 2024-08-06 PROCEDURE — 96366 THER/PROPH/DIAG IV INF ADDON: CPT

## 2024-08-06 PROCEDURE — 2500000002 HC RX 250 W HCPCS SELF ADMINISTERED DRUGS (ALT 637 FOR MEDICARE OP, ALT 636 FOR OP/ED): Performed by: EMERGENCY MEDICINE

## 2024-08-06 PROCEDURE — 94640 AIRWAY INHALATION TREATMENT: CPT

## 2024-08-06 RX ORDER — PREDNISONE 20 MG/1
40 TABLET ORAL DAILY
Qty: 8 TABLET | Refills: 0 | Status: SHIPPED | OUTPATIENT
Start: 2024-08-06 | End: 2024-08-10

## 2024-08-06 RX ORDER — ALBUTEROL SULFATE 90 UG/1
1-2 INHALANT RESPIRATORY (INHALATION) EVERY 4 HOURS PRN
Qty: 18 G | Refills: 1 | Status: SHIPPED | OUTPATIENT
Start: 2024-08-06 | End: 2024-09-05

## 2024-08-06 RX ORDER — MAGNESIUM SULFATE HEPTAHYDRATE 40 MG/ML
2 INJECTION, SOLUTION INTRAVENOUS ONCE
Status: COMPLETED | OUTPATIENT
Start: 2024-08-06 | End: 2024-08-06

## 2024-08-06 RX ORDER — IPRATROPIUM BROMIDE AND ALBUTEROL SULFATE 2.5; .5 MG/3ML; MG/3ML
3 SOLUTION RESPIRATORY (INHALATION) EVERY 20 MIN
Status: COMPLETED | OUTPATIENT
Start: 2024-08-06 | End: 2024-08-06

## 2024-08-06 RX ORDER — ALBUTEROL SULFATE 0.83 MG/ML
2.5 SOLUTION RESPIRATORY (INHALATION) EVERY 4 HOURS PRN
Qty: 90 ML | Refills: 1 | Status: SHIPPED | OUTPATIENT
Start: 2024-08-06 | End: 2024-09-05

## 2024-08-06 NOTE — ED TRIAGE NOTES
C/o chest pain and shortness of breath that stared at 0600 this morning. Patient states it feels heavy on her chest and hard to breath states that she has a hx of gastric hernia. Hx: asthma, heart arrhythmia

## 2024-08-06 NOTE — ED PROVIDER NOTES
HPI   Chief Complaint   Patient presents with    Chest Pain     C/o chest pain and shortness of breath that stared at 0600 this morning. Patient states it feels heavy on her chest and hard to breath states that she has a hx of gastric hernia. Hx: asthma, heart arrhythmia        This is a 54-year-old female history of hiatal hernia HTN (well controlled without meds) who presents with chest pain and shortness of breath x 1 day.  Patient reports symptoms started shortly after she arrived at work around 6:30 in the morning.  Episode started with diaphoresis followed by anterior chest pressure.  She called her  and went home and tried to lay down, thinking that would help, but symptoms persisted.  She said chest pain started radiating to her back and neck.  In the afternoon she went to urgent care and was directed to the ED for further evaluation.  She denies any history of CAD and does not see a cardiologist.              Patient History   Past Medical History:   Diagnosis Date    Diabetes mellitus (Multi)      No past surgical history on file.  No family history on file.  Social History     Tobacco Use    Smoking status: Every Day     Current packs/day: 0.25     Types: Cigarettes    Smokeless tobacco: Never   Substance Use Topics    Alcohol use: Yes     Alcohol/week: 1.0 standard drink of alcohol     Types: 1 Shots of liquor per week    Drug use: Not on file       Physical Exam   ED Triage Vitals   Temperature Heart Rate Respirations BP   08/05/24 2038 08/05/24 2038 08/05/24 2038 08/05/24 2038   36.4 °C (97.5 °F) 73 18 (!) 178/101      Pulse Ox Temp Source Heart Rate Source Patient Position   08/05/24 2038 08/05/24 2038 08/05/24 2038 --   99 % Temporal Monitor       BP Location FiO2 (%)     -- 08/05/24 2106      21 %       Physical Exam  Vitals and nursing note reviewed.   Constitutional:       General: She is not in acute distress.     Appearance: She is well-developed. She is diaphoretic. She is not  toxic-appearing.   HENT:      Head: Normocephalic and atraumatic.   Eyes:      Extraocular Movements: Extraocular movements intact.   Cardiovascular:      Rate and Rhythm: Normal rate and regular rhythm.   Pulmonary:      Effort: Pulmonary effort is normal. No tachypnea or respiratory distress.      Breath sounds: No stridor.   Abdominal:      Palpations: Abdomen is soft.      Tenderness: There is no abdominal tenderness. There is no guarding or rebound.   Musculoskeletal:         General: Normal range of motion.      Cervical back: Normal range of motion and neck supple.      Right lower leg: No edema.      Left lower leg: No edema.   Skin:     General: Skin is warm.      Coloration: Skin is not cyanotic.      Findings: No rash.   Neurological:      General: No focal deficit present.      Mental Status: She is alert and oriented to person, place, and time.   Psychiatric:         Mood and Affect: Mood normal.         Behavior: Behavior normal.           ED Course & MDM   ED Course as of 08/07/24 0044   Tue Aug 06, 2024   0024 EKG reviewed by me NSR normal axis normal intervals no ischemic changes [EK]      ED Course User Index  [EK] Elyse H Klerman, MD         Diagnoses as of 08/07/24 0044   Chest pain, unspecified type   Shortness of breath                       [place] Coma Scale Score: 15                        Medical Decision Making  This is a 54-year-old female hypertension hiatal hernia who presents with chest pain and shortness of breath started today, associated diaphoresis and markedly elevated blood pressure.  Patient intermittently uncomfortable but without respiratory distress or hypoxia.  EKG without ischemia or arrhythmia.  Chest x-ray reviewed by me without pneumothorax or pneumonia.  Labs reviewed, reassuring including normal electrolytes aside from borderline hypercalcemia and negative troponin x 2, though with mild erythrocytosis suggestive of dehydration.  CTA without aortic pathology.  Blood  pressure improved without intervention.  Patient treated with nebulizer treatment with improvement so we will add steroids for presumed asthma versus bronchitis despite no wheezing on exam.  Offered patient admission given chest pain or shortness of breath, but she would much rather go home and feels better after breathing treatments.  Recommend supportive care, close outpatient PCP, pulmonary and cardiology follow-up and return precautions.    Amount and/or Complexity of Data Reviewed  Independent Historian: spouse  Labs: ordered.  Radiology: ordered and independent interpretation performed.  ECG/medicine tests: ordered and independent interpretation performed. Decision-making details documented in ED Course.    Risk  Prescription drug management.  Decision regarding hospitalization.        Procedure  Procedures     Elyse H Klerman, MD  08/05/24 2208       Elyse H Klerman, MD  08/07/24 0047

## 2024-08-06 NOTE — DISCHARGE INSTRUCTIONS
You were seen in the ER for chest pressure and shortness of breath.  EKG and blood work reassuring.  You felt better with the breathing treatments and were started on a short course of steroids.  Please follow-up with your PCP and consider seeing a pulmonologist for your breathing.  Should also see a cardiologist for your chest pain.

## 2024-08-07 LAB
ATRIAL RATE: 68 BPM
P AXIS: 23 DEGREES
PR INTERVAL: 140 MS
Q ONSET: 249 MS
QRS COUNT: 11 BEATS
QRS DURATION: 91 MS
QT INTERVAL: 380 MS
QTC CALCULATION(BAZETT): 407 MS
QTC FREDERICIA: 398 MS
R AXIS: 30 DEGREES
T AXIS: 46 DEGREES
T OFFSET: 439 MS
VENTRICULAR RATE: 69 BPM